# Patient Record
Sex: FEMALE | Race: BLACK OR AFRICAN AMERICAN | NOT HISPANIC OR LATINO | URBAN - METROPOLITAN AREA
[De-identification: names, ages, dates, MRNs, and addresses within clinical notes are randomized per-mention and may not be internally consistent; named-entity substitution may affect disease eponyms.]

---

## 2018-02-15 ENCOUNTER — OFFICE VISIT CONVERTED (OUTPATIENT)
Dept: PULMONOLOGY | Facility: CLINIC | Age: 68
End: 2018-02-15
Attending: PHYSICIAN ASSISTANT

## 2018-02-22 ENCOUNTER — OFFICE VISIT CONVERTED (OUTPATIENT)
Dept: PULMONOLOGY | Facility: CLINIC | Age: 68
End: 2018-02-22
Attending: PHYSICIAN ASSISTANT

## 2018-02-26 ENCOUNTER — OFFICE VISIT CONVERTED (OUTPATIENT)
Dept: CARDIOLOGY | Facility: CLINIC | Age: 68
End: 2018-02-26
Attending: INTERNAL MEDICINE

## 2021-05-16 VITALS
BODY MASS INDEX: 34.99 KG/M2 | DIASTOLIC BLOOD PRESSURE: 78 MMHG | WEIGHT: 210 LBS | HEIGHT: 65 IN | HEART RATE: 48 BPM | SYSTOLIC BLOOD PRESSURE: 102 MMHG

## 2021-05-28 VITALS
RESPIRATION RATE: 20 BRPM | DIASTOLIC BLOOD PRESSURE: 71 MMHG | OXYGEN SATURATION: 97 % | BODY MASS INDEX: 36.65 KG/M2 | HEIGHT: 65 IN | HEART RATE: 50 BPM | DIASTOLIC BLOOD PRESSURE: 99 MMHG | SYSTOLIC BLOOD PRESSURE: 141 MMHG | RESPIRATION RATE: 16 BRPM | TEMPERATURE: 97.7 F | HEIGHT: 65 IN | TEMPERATURE: 98.1 F | BODY MASS INDEX: 36.04 KG/M2 | WEIGHT: 220 LBS | SYSTOLIC BLOOD PRESSURE: 119 MMHG | OXYGEN SATURATION: 91 % | WEIGHT: 216.31 LBS | HEART RATE: 120 BPM

## 2021-05-28 NOTE — PROGRESS NOTES
Patient: JOCELYNE RUIZ     Acct: [id number]     Report: #[id number]  UNIT #: [id number]     : 1950    Encounter Date:2018  PRIMARY CARE: TAMMY PEGUERO  ***Signed***  --------------------------------------------------------------------------------------------------------------------  Chief Complaint      Encounter Date      2018            Referring Provider      Kale Mas            Patient Complaint      Patient is complaining of       One Month Follow Up/Labs            VITALS      Height 5 ft 5 in / 165.1 cm      Weight 216 lbs 5 oz / 98.362220 kg      BSA 2.16 m2      BMI 36.0 kg/m2      Temperature 97.7 F / 36.5 C - Oral      Pulse 50      Respirations 16      Blood Pressure 119/71 Sitting, Right Arm      Pulse Oximetry 97%, 2            HPI      The patient is a very pleasant 67 year old  female who I saw 1     week ago for hospital follow up after she was admitted with an acute diastolic     heart failure exacerbation flare up of allergic bronchopulmonary aspergillosis     as well as acute kidney injury. She is now at [facility] rehab where she went     after being diuresed and she is currently using Brovana and Pulmicort     nebulizers twice daily.             When I saw her 1 week ago she complained of very significant lower extremity     edema and abdominal distension as well as facial and neck edema. She reports     she was only taking 1 mg of Bumex once daily. She also complained of     significant cough and pleuritic chest pain but today reports that her coughing     is improving after I added DuoNeb and increased her Bumex to 1 mg twice daily     rather than once daily. She was also complaining of right hand weakness and I     had written for this to be evaluated at the rehab facility but she states it     never was and her right hand is still weak and she is having numbness in the     fingers that keep her from gripping anything. She is currently able to  do some     ambulation without any significant shortness of breath but needs a wheelchair     for longer distances due to weakness. She reports that her cough is improving     but she is still occasionally coughing up grayish phlegm. She is chronically on     2 liters nasal cannula for chronic hypoxic respiratory failure which is     unchanged. She continues to take voriconazole for her allergic bronchopulmonary     aspergillosis plus Prednisone 20 mg daily. She is on Coumadin for her chronic     atrial fibrillation and this is being monitored at [facility].             I have personally reviewed the Review of Systems, past family, social, surgical     and medical histories and I agree with the findings.            ROS      Constitutional:  Denies: Fatigue, Fever, Weight gain, Weight loss, Chills,     Insomnia, Other      Respiratory/Breathing:  Complains of: Shortness of air, Wheezing, Cough, Other (    body is swollen ), Denies: Hemoptysis, Pleuritic pain      Endocrine:  Denies: Polydipsia, Polyuria, Heat/cold intolerance, Abnorml     menstrual pattern, Diabetes, Other      Eyes:  Denies: Blurred vision, Vision Changes, Other      Ears, nose, mouth, throat:  Denies: Congestion, Dysphagia, Hearing Changes,     Nose Bleeding, Nasal Discharge, Throat pain, Tinnitus, Other      Cardiovascular:  Denies: Chest Pain, Exertional dyspnea, Peripheral Edema,     Palpitations, Syncope, Wake up Gasping for air, Orthopnea, Tachycardia, Other      Gastrointestinal:  Denies: Abdominal pain/cramping, Bloody stools, Constipation    , Diarrhea, Melena, Nausea, Vomiting, Other      Genitourinary:  Denies: Dysuria, Urinary frequency, Incontinence, Hematuria,     Urgency, Other      Musculoskeletal:  Denies: Joint Pain, Joint Stiffness, Joint Swelling, Myalgias    , Other      Hematologic/lymphatic:  DENIES: Lymphadenopathy, Bruising, Bleeding tendencies,     Other      Neurologic:  Denies: Headache, Numbness, Weakness, Seizures, Other  "     Psychiatric:  Denies: Anxiety, Appropriate Effect, Depression, Other      Sleep:  No: Excessive daytime sleep, Morning Headache?, Snoring, Insomnia?,     Stop breathing at sleep?, Other      Integumentary:  Denies: Rash, Dry skin, Skin Warm to Touch, Other            FAMILY/SOCIAL/MEDICAL HX      Surgical History:  Yes: Abdominal Surgery (\"CLOT IN STOMACH\", WOUND VAC IN     STOMACH), CABG, No: Appendectomy, Bladder Surgery, Bowel Surgery,     Cholecystectomy, Head Surgery, Oral Surgery, Orthopedic Surgery, Vascular     Surgery      Stroke - Family Hx:  Aunt      Heart - Family Hx:  Father      Diabetes - Family Hx:  Brother, Sister      Cancer/Type - Family Hx:  Father      Other Family Medical History:  Aunt (EMPHYSEMA), Cousin      Is Father Still Living?:  No      Is Mother Still Living?:  Yes      Social History:  No Tobacco Use, No Alcohol Use, No Recreational Drug use      Smoking status:  Current every day smoker (2-3 CIGS PER DAY / FORMER HEAVY     SMOKER )      Anticoagulation Therapy:  No      Antibiotic Prophylaxis:  No      Medical History:  Yes: Arthritis (IN BACK), Asthma, Blood Disease (BLOOD     TRANSFUSION REACTION, ANEMIA), Chronic Bronchitis/COPD, Congestive Heart Failu,     Diabetes (TYPE II), Seizures (AFTER TAKING METFORMIN), Heart Attack, Hemorrhoids    /Rectal Prob (THINKS SHE MAY HAVE 2 HERNIAS), High Blood Pressure, Shortness Of     Breath, Stroke, Miscellaneous Medical/oth (RHEUMATIC FEVER), No: Chemotherapy/    Cancer, Deafness or Ringing Ears, Sinus Trouble            Hx Influenza Vaccination:  Yes      Date Influenza Vaccine Given:  Nov 1, 2017      Influenza Vaccine Declined:  No      Hx Pneumococcal Vaccination:  Yes      Encouraged to follow-up with:  PCP regarding preventative exams.      Chart initiated by      carmen knowles            ALLERGIES/MEDICATIONS      Allergies:        Coded Allergies:             LEVOFLOXACIN (Verified  Allergy, Mild, NUMBNESS AND TINGLING IN " FINGERS     AND LEGS , 2/22/18)           ERYTHROMYCIN BASE (Verified  Allergy, Unknown, 2/22/18)           LISINOPRIL (Verified  Allergy, Unknown, 2/22/18)           METFORMIN (Verified  Allergy, Unknown, 2/22/18)           METOPROLOL (Verified  Allergy, Unknown, 2/22/18)           SULFA (SULFONAMIDE ANTIBIOTICS) (Verified  Allergy, Unknown, 2/22/18)      Medications    Last Reconciled on 2/22/18 15:44 by JOSE ROBERTO MCKEON PA-C      Bumetanide (Bumetanide) 2 Mg Tablet      2 MG PO BID, #60 TAB 1 Refill         Prov: Estefania Mckeon PA-C         2/22/18       Benzonatate (Tessalon Perles) 100 Mg Cap      100 MG PO Q4H Y for COUGH, #100 CAP 2 Refills         Prov: Estefania Mckeon PA-C         2/15/18       Albuterol/Ipratropium (Duoneb) 3 Ml Ampul.neb      3 ML INH Q4H Y for SHORTNESS OF BREATH, #120 NEB 6 Refills         Prov: Estefania Mckeon PA-C         2/15/18       Warfarin Sod (Coumadin*) 3 Mg Tablet      3 MG PO QDAY, #30 TAB 0 Refills         Prov: Kale Mas         2/10/18       Diltiazem CD (Diltiazem CD) 120 Mg Cap.er.24h      120 MG PO QDAY for 30 Days, #30 CAP.ER         Prov: Kale Mas         2/10/18       predniSONE* (Deltasone*) 10 Mg Tablet      20 MG PO QDAY for 30 Days, #60 TAB 1 Refill         Prov: Kale Mas         2/10/18       Cholecalciferol (Vitamin D3*) 1,000 Units Capsule      1000 UNITS PO QDAY, #30 CAP 0 Refills         Reported         1/31/18       Budesonide/Formoterol Fumarate (Symbicort 80/4.5 Mcg) 10.2 Gm Hfa.aer.ad      1 PUFF INH RTBID, #1 INH         Reported         1/31/18       Tiotropium Bromide (Spiriva Respimat 2.5 mcg/Puff) 4 Gm Mist.inhal      2 PUFFS INH QDAY, #1 MDI 4 Refills         Prov: Ilia Lowe         1/31/18       Voriconazole (Voriconazole) 200 Mg Tablet      200 MG PO BID, #60 TAB 5 Refills         Prov: IRAM LEPE         12/22/17       Carvedilol (Coreg) 6.25 Mg Tablet      3.125 MG PO BID, #60 TAB 3 Refills         Prov: IRAM LEPE         12/22/17        Macitentan (OPSUMIT) 10 Mg Tablet      10 MG PO QDAY for 30 Days, #90 TAB 9 Refills         Prov: Ilia Lowe         11/16/17       Michael-Fluticasone (Fluticasone 50 mcg) 16 Gm Spray.susp      2 PUFFS NARE EACH QDAY, #1 BOTTLE 6 Refills         Prov: Ilia Lowe         10/26/17       Hydrocodone/Acetaminophen 5/325 MG (Hydrocodone/Acetaminophen 5/325 MG) 1 Each     Tablet      1 TAB PO QID Y for PAIN, TAB         Reported         10/26/17       glipiZIDE (glipiZIDE*) 10 Mg Tablet      10 MG PO BID, #30 TAB 0 Refills         Reported         10/26/17       Loratadine (Loratadine) 10 Mg Tablet      10 MG PO QDAY, #30 TAB 0 Refills         Reported         10/26/17       Pioglitazone Hcl (Actos*) 15 Mg Tab      15 MG PO QDAY, #30 TAB         Reported         10/26/17       Neb-Budesonide (Budesonide) 0.5 Mg/2 Ml Ampul.neb      0.5 MG INH BID, #60 NEB 6 Refills         Prov: Ilia Lowe         4/19/17       Arformoterol Tartrate (Brovana) 15 Mcg/2 Ml Vial.neb      15 MCG INH BID, #60 NEB 6 Refills         Prov: Ilia Lowe         4/19/17       Miscellaneous Medical Supply (Pulse Oximeter) 1 Each Each      1 EACH XX ONCE for Chronic Hypoxemia, #1 EACH 0 Refills         Prov: SALOME WALLIS         4/4/17       Pantoprazole (Protonix*) 40 Mg Tablet.dr      40 MG PO QDAY@07, #30 TAB 0 Refills         Reported         3/27/17       Aspirin (Aspirin*) 81 Mg Tab.chew      81 MG PO QDAY, #30 TAB.CHEW 0 Refills         Reported         6/19/16      Current Medications      Current Medications Reviewed 2/22/18            EXAM      GEN-patient appears stated age resting comfortable in no acute distress. Her     neck and face is edematous.        Eyes-PERRL,  conjunctiva are normal in appearance extraocular muscles are intact    , no scleral icterus      Nasal-both nares are patent turbinates appear normal no polyps seen no nasal     discharge or ulcerations      Ears-tympanic membranes are normal no erythema no  bulging, normal to inspection      Lymphatic-no swollen or enlarged cervical nodes, or axillary node, or femoral     nodes, or supraclavicular nodes      Mouth normal dentition, no erythema no ulcerations oropharynx appears normal no     exudate no evidence of postnasal drip, MP(default value)      Neck-there are no palpable supraclavicular or cervical adenopathy, thyroid is     normal in appearance no apparent nodules, there is no inspiratory or expiratory     stridor      Respiratory- mildly decreased with mildly coarse breath sounds in bilateral     lung bases.       Cardiovascular-the heart rate irregularly irregular rhythm, tachycardic rate,     no murmurs, rubs or gallops appreciated.       GI-the abdomen is obese, slightly firm, mildly distended, nontender to     palpation.       Extremities-bilateral lower extremities are warm and dry with +2-3 pitting     edema up to knees bilaterally.       Musculoskeletal-Normal strength in upper and lower extremities, inspection     shows no evidence of muscle atrophy. Bilateral lower extremities have +3     pitting edema slightly greater on the left than the right.       Skin-skin is normal in appearance it is warm and dry, no rashes present, no     evidence of cyanosis, palpation reveals no masses      Neurological-the patient is alert and oriented to time place and person, moves     all 4 extremities, normal gait, normal affect and mood, CN2-12 intact      Psych-normal judgment and insight is good, normal mood and affect, alert and     oriented to person, place, and time, and date      Vitals      Vitals:             Height 5 ft 5 in / 165.1 cm           Weight 216 lbs 5 oz / 98.467609 kg           BSA 2.16 m2           BMI 36.0 kg/m2           Temperature 97.7 F / 36.5 C - Oral           Pulse 50           Respirations 16           Blood Pressure 119/71 Sitting, Right Arm           Pulse Oximetry 97%, 2            REVIEW      Results Reviewed      [facility] Results  Reviewed?:  Yes Prev Lab Results, Yes Prev Radiology Results, Yes     Previous [facility] Records            PLAN      Assessment      Notes      New Medications      * Bumetanide 2 MG TABLET: 2 MG PO BID #60       Replaced Bumetanide (Bumex) 1 MG TABLET:       1 MG PO BID #60      Discontinued Medications      * Bumetanide (Bumex) 1 MG TABLET: 1 MG PO BID #60      ASSESSMENT:      1. Acute on chronic diastolic congestive heart failure significant volume     overload.        2. Allergic bronchopulmonary aspergillosis with recent exacerbation now     resolving.       3. Chronic obstructive pulmonary disease without acute exacerbation.       4. Chronic hypoxic respiratory failure on 2 liters nasal cannula.       5. Obesity with BMI 36.0      6. Chronic atrial fibrillation on Coumadin.        7.  Significant lower extremity edema.       8. Cough resolving.       9. Right hand numbness and weakness.              PLAN:      1. Last week I increased her Bumex to 1 mg PO twice daily from 1 mg PO daily     and had a basic metabolic panel checked on 02/20/18 which showed her renal     function to be stable with creatinine 0.9, BUN 36, potassium 4.5 and sodium     132. Given that she is still significantly volume overloaded, I will increase     her Bumex to 2 mg PO twice daily and I will actually have them give her 2 mg IV     twice daily for 2 days and then change to PO. I will have another basic     metabolic panel checked in 1 week to ensure stabilization of her electrolytes     and renal function.       2. I have written an order for [facility] to have her weight checked daily at     the rehab and to call her cardiologist Dr. Mireles for any weight gain greater     than 2 pounds overnight or 5 lbs in a week. His office should be the one     managing her diuretics. I have also written for her to be on a diet of 2 gram     daily sodium or less and to have her legs elevated while she is sitting.       3. Her shortness of breath and  coughing seem to be improving so I will continue     her Brovana and Pulmicort nebulizers and she can also use DuoNeb as needed.       4. Continue on Opsumit for her pulmonary hypertension and continue voriconazole     and Prednisone.       5. I have again asked that her right hand numbness and weakness be evaluated     and I have written for a neurology consult to do this since it does not appear     that her hand was evaluated at rehab despite my past orders.       6. We did call the rehab facility today to discuss these orders because all of     them were not completed after her last office visit. They did not send the     results of he basic metabolic panel with her today but we will able to receive     them by fax.       7. She should keep her existing follow up appointment for next month.                 Disclaimer: Converted document may not contain table formatting or lab diagrams. Please see [facility] for the authenticated document.

## 2021-05-28 NOTE — PROGRESS NOTES
Patient: JOCELYNE RUIZ     Acct: [id number]     Report: #[id number]  UNIT #: [id number]     : 1950    Encounter Date:02/15/2018  PRIMARY CARE: TAMMY PEGUERO  ***Signed***  --------------------------------------------------------------------------------------------------------------------  Chief Complaint      Encounter Date      Feb 15, 2018            Referring Provider      Tan Mas            Patient Complaint      Patient is complaining of      pt  here for 7 day HERNANDEZ            TRANSITION OF CARE 7 D      Transition of Care      HERNANDEZ 7 Day Followup      Jocelyne presents to office for follow up post discharge from inpatient status     within 7 calendar days. Patient was contacted within 2 business days via phone     conversation. Documentation of that phone call is present in the patient's     electronic chart. She  was admitted to inpatient facility on 18 and was     discharged on 2-10-18 due to: Acute decompensation of diastolic CHF      ABPA      Interstitial lung disease      pulmonary hypertension on opsumit      DJD of C-Spine      Electrolyte derangement due to diuresis and hyperglycemia      Acute COPD exacerbation       Acute on chronic hypoxic and hypercapnic respiratory failure      3 mm RLL pulmonary nodule; stable from at least 2017      Herniation of a small portion of the right lung through anterior right chest     wall      MER with CKD stage 3      Hyponatremia      CAD      Hypertension      Chronic Atrial fibrillation      Supratherapeutic INR on admission without acute bleeding; now therapeutic      Long term monitoring of anticoagulation      Type II diabetes mellitus; hyperglycemia/uncontrolled on admission      Tobacco abuse with cigarettes      History of CVA      Osteoarthritis-Multiple Joints      History of rheumatic valvular heart disease s/p Bioprosthetic mitral valve     replacement      Pulmonary Hypertension .            Admitting MD: TAN MAS             Hers  discharge summary has been reviewed and placed in the patient's     electronic chart.            Problem List      Problem List Reviewed      Patient's problem list has been reviewed from patient discharge summary.            Medications Reviewed      Meds Reviewed      Patient s outpatient medication list has been reconciled with the medication     list from the discharge summary and has been reviewed with the patient.            VITALS      Height 5 ft 5 in / 165.1 cm      Weight 220 lbs 0 oz / 99.007498 kg      BSA 2.18 m2      BMI 36.6 kg/m2      Temperature 98.1 F / 36.72 C - Oral      Pulse 120      Respirations 20      Blood Pressure 141/99 Sitting, Left Arm      Pulse Oximetry 91%, NASAL CANNULA, 2 lpm            HPI      The patient is a 67 year old female accompanied today by a nursing assistant     from [facility] where she has been for the past several days after being     discharged from [facility]. She was admitted to [facility] with a multitude of issues including acute decompensated diastolic     congestive heart failure, flare up of allergic bronchopulmonary aspergillosis,     significant back pain noted to have degenerative disc disease of her C-spine,     acute kidney injury and super therapeutic INR due to the Coumadin that she     takes for her chronic atrial fibrillation. We saw her in the hospital and     optimized her from a pulmonary standpoint with breathing treatments and she has     continued to use Brovana and Pulmicort nebulizers twice daily while at     [facility]. She was also diuresed but reports that she has continued to have     significant lower extremity edema and abdominal distension as well as facial     and neck edema since her discharge. She reports that all this edema has been     ongoing for several months. Her shortness of breath is gradually improving but     she still complains of significant cough which causes pleuritic chest  pain when     she does cough. She is coughing up clear to yellow to gray sputum which has     been chronic for her as of late. She has also had significant wheezing. Her     symptoms are improved with her breathing treatments but still ongoing. She has     been very weak and debilitated which is why she needed to go to the rehab     facility and she reports that some of this has improving but she has a lot of     issues with chronic pain including back pain. She is also complaining of some     right hand and finger numbness that she reports has been going on since she got     to the rehab facility and she thinks may be related to a muscle relaxer she was     given. She has not had this evaluated at the rehab yet but she is able to move     her hand normally form me today. She is chronically on 2 liters nasal cannula     for chronic hypoxic respiratory failure. She continues to report shortness of     breath with any exertion such as ambulation from room to room, relieved with     rest. She continues to take voriconazole for her allergic bronchopulmonary     aspergillosis as well as Prednisone 20 mg daily. She is on Coumadin for chronic     atrial fibrillation and reports that she has had more palpitations recently and     has noticed her heart rate to be elevated. She does take Cardizem  mg     daily but her heart rate was noted to be 122 today in the office.             I have personally reviewed the Review of Systems, past family, social, surgical     and medical histories and I agree with the findings.            ROS      Constitutional:  Denies: Fatigue, Fever, Weight gain, Weight loss, Chills,     Insomnia, Other      Respiratory/Breathing:  Complains of: Shortness of air, Wheezing, Cough, Denies    : Hemoptysis, Pleuritic pain, Other      Endocrine:  Denies: Polydipsia, Polyuria, Heat/cold intolerance, Abnorml     menstrual pattern, Diabetes, Other      Eyes:  Denies: Blurred vision, Vision Changes,  "Other      Ears, nose, mouth, throat:  Denies: Congestion, Dysphagia, Hearing Changes,     Nose Bleeding, Nasal Discharge, Throat pain, Tinnitus, Other      Cardiovascular:  Denies: Chest Pain, Exertional dyspnea, Peripheral Edema,     Palpitations, Syncope, Wake up Gasping for air, Orthopnea, Tachycardia, Other      Gastrointestinal:  Denies: Abdominal pain/cramping, Bloody stools, Constipation    , Diarrhea, Melena, Nausea, Vomiting, Other      Genitourinary:  Denies: Dysuria, Urinary frequency, Incontinence, Hematuria,     Urgency, Other      Musculoskeletal:  Denies: Joint Pain, Joint Stiffness, Joint Swelling, Myalgias    , Other      Hematologic/lymphatic:  DENIES: Lymphadenopathy, Bruising, Bleeding tendencies,     Other      Neurologic:  Denies: Headache, Numbness, Weakness, Seizures, Other      Psychiatric:  Denies: Anxiety, Appropriate Effect, Depression, Other      Sleep:  No: Excessive daytime sleep, Morning Headache?, Snoring, Insomnia?,     Stop breathing at sleep?, Other      Integumentary:  Denies: Rash, Dry skin, Skin Warm to Touch, Other            FAMILY/SOCIAL/MEDICAL HX      Surgical History:  Yes: Abdominal Surgery (\"CLOT IN STOMACH\", WOUND VAC IN     STOMACH), CABG, No: Appendectomy, Bladder Surgery, Bowel Surgery,     Cholecystectomy, Head Surgery, Oral Surgery, Orthopedic Surgery, Vascular     Surgery      Stroke - Family Hx:  Aunt      Heart - Family Hx:  Father      Diabetes - Family Hx:  Brother, Sister      Cancer/Type - Family Hx:  Father      Other Family Medical History:  Aunt (EMPHYSEMA), Cousin      Is Father Still Living?:  No      Is Mother Still Living?:  Yes      Social History:  No Tobacco Use, No Alcohol Use, No Recreational Drug use      Smoking status:  Current every day smoker (2-3 CIGS PER DAY / FORMER HEAVY     SMOKER )      Anticoagulation Therapy:  No      Antibiotic Prophylaxis:  No      Medical History:  Yes: Arthritis (IN BACK), Asthma, Blood Disease (BLOOD   "   TRANSFUSION REACTION, ANEMIA), Chronic Bronchitis/COPD, Congestive Heart Failu,     Diabetes (TYPE II), Seizures (AFTER TAKING METFORMIN), Heart Attack, Hemorrhoids    /Rectal Prob (THINKS SHE MAY HAVE 2 HERNIAS), High Blood Pressure, Shortness Of     Breath, Stroke, Miscellaneous Medical/oth (RHEUMATIC FEVER), No: Chemotherapy/    Cancer, Deafness or Ringing Ears, Sinus Trouble            Hx Influenza Vaccination:  Yes      Date Influenza Vaccine Given:  Nov 1, 2017      Influenza Vaccine Declined:  No      2 or More Falls Past Year?:  No      Fall Past Year with Injury?:  No      Hx Pneumococcal Vaccination:  Yes      Encouraged to follow-up with:  PCP regarding preventative exams.      Chart initiated by      GABE GATES MA            ALLERGIES/MEDICATIONS      Allergies:        Coded Allergies:             LEVOFLOXACIN (Verified  Allergy, Mild, NUMBNESS AND TINGLING IN FINGERS     AND LEGS , 2/15/18)           ERYTHROMYCIN BASE (Verified  Allergy, Unknown, 2/15/18)           LISINOPRIL (Verified  Allergy, Unknown, 2/15/18)           METFORMIN (Verified  Allergy, Unknown, 2/15/18)           METOPROLOL (Verified  Allergy, Unknown, 2/15/18)           SULFA (SULFONAMIDE ANTIBIOTICS) (Verified  Allergy, Unknown, 2/15/18)      Medications    Last Reconciled on 2/15/18 10:34 by JOSE ROBERTO SIN PA-C      Benzonatate (Tessalon Perles) 100 Mg Cap      100 MG PO Q4H Y for COUGH, #100 CAP 2 Refills         Prov: Estefania Sin PA-C         2/15/18       Albuterol/Ipratropium (Duoneb) 3 Ml Ampul.neb      3 ML INH Q4H Y for SHORTNESS OF BREATH, #120 NEB 6 Refills         Prov: Estefania Sin PA-C         2/15/18       Bumetanide (Bumex) 1 Mg Tablet      1 MG PO BID, #60 TAB         Prov: Estefania Sin PA-C         2/15/18       Warfarin Sod (Coumadin*) 3 Mg Tablet      3 MG PO QDAY, #30 TAB 0 Refills         Prov: Kale Mas         2/10/18       Diltiazem CD (Diltiazem CD) 120 Mg Cap.er.24h      120 MG PO QDAY for 30  Days, #30 CAP.ER         Prov: Kale Mas         2/10/18       predniSONE* (Deltasone*) 10 Mg Tablet      20 MG PO QDAY for 30 Days, #60 TAB 1 Refill         Prov: Kale Mas         2/10/18       Cholecalciferol (Vitamin D3*) 1,000 Units Capsule      1000 UNITS PO QDAY, #30 CAP 0 Refills         Reported         1/31/18       Budesonide/Formoterol Fumarate (Symbicort 80/4.5 Mcg) 10.2 Gm Hfa.aer.ad      1 PUFF INH RTBID, #1 INH         Reported         1/31/18       Tiotropium Bromide (Spiriva Respimat 2.5 mcg/Puff) 4 Gm Mist.inhal      2 PUFFS INH QDAY, #1 MDI 4 Refills         Prov: Ilia Lowe         1/31/18       Voriconazole (Voriconazole) 200 Mg Tablet      200 MG PO BID, #60 TAB 5 Refills         Prov: IRAM LEPE         12/22/17       Carvedilol (Coreg) 6.25 Mg Tablet      3.125 MG PO BID, #60 TAB 3 Refills         Prov: IRAM LEPE         12/22/17       Macitentan (OPSUMIT) 10 Mg Tablet      10 MG PO QDAY for 30 Days, #90 TAB 9 Refills         Prov: Ilia Lowe         11/16/17       Michael-Fluticasone (Fluticasone 50 mcg) 16 Gm Spray.susp      2 PUFFS NARE EACH QDAY, #1 BOTTLE 6 Refills         Prov: Ilia Lowe         10/26/17       Hydrocodone/Acetaminophen 5/325 MG (Hydrocodone/Acetaminophen 5/325 MG) 1 Each     Tablet      1 TAB PO QID Y for PAIN, TAB         Reported         10/26/17       glipiZIDE (glipiZIDE*) 10 Mg Tablet      10 MG PO BID, #30 TAB 0 Refills         Reported         10/26/17       Loratadine (Loratadine) 10 Mg Tablet      10 MG PO QDAY, #30 TAB 0 Refills         Reported         10/26/17       Pioglitazone Hcl (Actos*) 15 Mg Tab      15 MG PO QDAY, #30 TAB         Reported         10/26/17       Neb-Budesonide (Budesonide) 0.5 Mg/2 Ml Ampul.neb      0.5 MG INH BID, #60 NEB 6 Refills         Prov: Ilia Lowe         4/19/17       Arformoterol Tartrate (Brovana) 15 Mcg/2 Ml Vial.neb      15 MCG INH BID, #60 NEB 6 Refills         Prov: Ilia Lowe          4/19/17       Miscellaneous Medical Supply (Pulse Oximeter) 1 Each Each      1 EACH XX ONCE for Chronic Hypoxemia, #1 EACH 0 Refills         Prov: SALOME WALLIS         4/4/17       Pantoprazole (Protonix*) 40 Mg Tablet.dr      40 MG PO QDAY@07, #30 TAB 0 Refills         Reported         3/27/17       Aspirin (Aspirin*) 81 Mg Tab.chew      81 MG PO QDAY, #30 TAB.CHEW 0 Refills         Reported         6/19/16      Current Medications      Current Medications Reviewed 2/15/18            EXAM      GEN-patient appears stated age resting comfortable in no acute distress      Eyes-PERRL,  conjunctiva are normal in appearance extraocular muscles are intact    , no scleral icterus      Nasal-both nares are patent turbinates appear normal no polyps seen no nasal     discharge or ulcerations      Ears-tympanic membranes are normal no erythema no bulging, normal to inspection      Lymphatic-no swollen or enlarged cervical nodes, or axillary node, or femoral     nodes, or supraclavicular nodes      Mouth normal dentition, no erythema no ulcerations oropharynx appears normal no     exudate no evidence of postnasal drip, MP(default value)      Neck-there are no palpable supraclavicular or cervical adenopathy, thyroid is     normal in appearance no apparent nodules, there is no inspiratory or expiratory     stridor      Respiratory- mildly diminished breath sounds throughout with scattered     expiratory wheezing and rhonchi appreciated throughout.       Cardiovascular-the heart rate irregularly irregular rhythm, tachycardic rate,     no murmurs, rubs or gallops appreciated.       GI-the abdomen is obese, slightly firm, mildly distended, nontender to     palpation.       Extremities-bilateral lower extremities are warm and dry with +2-3 pitting     edema up to knees bilaterally.       Musculoskeletal-Normal strength in upper and lower extremities, inspection     shows no evidence of muscle atrophy      Skin-skin is normal in  appearance it is warm and dry, no rashes present, no     evidence of cyanosis, palpation reveals no masses      Neurological-the patient is alert and oriented to time place and person, moves     all 4 extremities, normal gait, normal affect and mood, CN2-12 intact      Psych-normal judgment and insight is good, normal mood and affect, alert and     oriented to person, place, and time, and date      Vitals      Vitals:             Height 5 ft 5 in / 165.1 cm           Weight 220 lbs 0 oz / 99.448028 kg           BSA 2.18 m2           BMI 36.6 kg/m2           Temperature 98.1 F / 36.72 C - Oral           Pulse 120           Respirations 20           Blood Pressure 141/99 Sitting, Left Arm           Pulse Oximetry 91%, NASAL CANNULA, 2 lpm            REVIEW      Results Reviewed      PCCS Results Reviewed?:  Yes Prev Lab Results, Yes Prev Radiology Results, Yes     Previous [facility] Records (I personally reviewed the patient's pulmonary     consultations, progress notes and discharge summary from her recent     hospitalization. )      Lab Results      I personally reviewed the patient's laboratory work.      Radiographic Results               [facility]                PACS RADIOLOGY REPORT            Patient: JOCELYNE RUIZ   Acct: #[id number]   Report: #[id number]            UNIT #: [id number]    DOS: 18 1704      INSURANCE:MEDICARE PART A   LOCATION:[place] [address]   : 1950            PROVIDERS      ADMITTING:  Scar Marino   ATTENDING: Scar Marino      FAMILY:  MARIELENATAMMY   ORDERING:  Scar Marino         OTHER:    DICTATING:  Carlyle Enriquez MD            REQ #:[id number]   EXAM:WO - CT CHEST without CONTRAST      REASON FOR EXAM:  DYSPNEA, CHF, COPD      REASON FOR VISIT:  CP,MER,CHF EXAC.,COPD EXAC.            *******Signed******         PROCEDURE:   CT CHEST WITHOUT CONTRAST             COMPARISON:   [facility], CT,  CHEST W/O CONTRAST, 3/27/2017, 19:    02.  [facility], CT, ABD PEL W/O CONTRAST, 5/20/2012, 1:02.  [facility],     CR, CHEST AP/PA 1       VIEW, 1/31/2018, 15:33.  [facility], CT, CHEST W/ CONTRAST, 11/08/ 2017, 15:58.             INDICATIONS:   DYSPNEA, CONGESTIVE HEART FAILURE, COPD             TECHNIQUE:   CT images were created without the administration of contrast     material.               PROTOCOL:     Standard imaging protocol performed                RADIATION:     DLP: 431.8mGy*cm          Automated exposure control was utilized to minimize radiation dose.              FINDINGS:         There is mild debris within the mid trachea, and the residual central     tracheobronchial tree is       clear. Mild pulmonary fibrotic changes appear similar to prior CT. Herniation     of a small portion of       the right lung through the anterior right chest wall is unchanged. No focal     airspace consolidation       is identified. A 3 mm right lower lobe nodule image 43 is unchanged. No new     pulmonary nodule is       identified. There is no pleural effusion.             The heart is mildly enlarged with some partially calcified atherosclerotic     disease within the       coronary arteries. The thoracic aorta appears normal caliber. The main     pulmonary artery is mildly       enlarged, suggestive of mild pulmonary arterial hypertension. No abnormally     enlarged lymph nodes       are identified.             Partial evaluation of the upper abdomen is unremarkable.             No aggressive osseous lesions are identified.             CONCLUSION:         1. No acute cardiopulmonary process.      2. Mild pulmonary fibrotic changes appear similar to prior CT.      3. 3 mm right lower lobe pulmonary nodule is stable from at least 03/27/2017,     and is likely benign.              SALOME CROWE MD             Electronically Signed and Approved By: SALOME CROWE  MD on 1/31/2018 at 18:    17                        Until signed, this is an unconfirmed preliminary report that may contain      errors and is subject to change.                                              RODD1:      D:01/31/18 1817            PLAN      Assessment      Notes      New Medications      * Albuterol/Ipratropium (Duoneb) 3 ML AMPUL.NEB: 3 ML INH Q4H PRN SHORTNESS OF     BREATH #120       Instructions: DIAGNOSIS CODE REQUIRED PRIOR TO PRESCRIBING.      * Benzonatate (Tessalon Perles) 100 MG CAP: 100 MG PO Q4H PRN COUGH #100      Changed Medications      * Bumetanide (Bumex) 1 MG TABLET:       From: 1 MG PO QDAY #60       To: 1 MG PO BID #60      New Diagnostics      * BMP, Week       Dx: Chronic kidney disease - N18.9      ASSESSMENT:      1. Acute on chronic diastolic congestive heart failure.       2. Allergic bronchopulmonary aspergillosis with recent exacerbation improving.       3. Chronic obstructive pulmonary disease with emphysema and acute exacerbation     improving.       4. Chronic hypoxic respiratory failure on 2 liters nasal cannula.       5. Obesity with BMI 36.6      6. Chronic atrial fibrillation on Coumadin with heart rate currently     uncontrolled.       7. Lower extremity edema.       8. Cough.       9.  Wheeze.       10. Dyspnea on exertion.       11. Right hand numbness.             PLAN:      1. At this time the patient appears to be significantly volume overloaded from     acute on chronic diastolic congestive heart failure exacerbation. She has     currently only been taking Bumex 1 mg PO daily so at this time I will increase     that to 1 mg PO twice daily and have a basic metabolic panel checked in 1 week.      2. I have instructed her to have her weight checked daily at her rehab facility     and to call her cardiologist Dr. Mireles for any weight gain greater than 2     pounds overnight or 5 pounds in a week. I have also counseled her on staying on     2 gram sodium daily  diet or less.       3. She has some continued wheezing and coughing that appears related to her     recent allergic bronchopulmonary aspergillosis exacerbation although her     shortness of breath is gradually improving. At this time I have added DuoNeb     and instructed her to use those every 4 hours as needed for cough, wheezing or     shortness of breath. I have also ordered Tessalon pearls to help with her cough     particularly at night when her cough is keeping her from sleeping.       4. I have instructed her to continue on Brovana and Pulmicort scheduled     nebulizers twice daily and to continue her voriconazole and Prednisone.       5. I have instructed her to continue her Opsumit for her pulmonary     hypertension.       6. I have requested that her vital signs be checked twice daily at her rehab     facility and have instructed them to call her cardiologist Dr. Mireles for any     persistent heart rate greater than 110 beats per minute. This is because it     appears her atrial fibrillation is also uncontrolled at this time and if her     heart rates are persistently elevated this may also be contributing to her     shortness of breath.       7. I advised that her right hand numbness should be evaluated by her rehab     physician or primary care provider as I am not the correct provider to evaluate     that for her today.       8. I have asked her to follow up in our office in 1 month so we can review her     laboratory results and see how she is doing with her medication changes. I have     instructed her to call as needed for any issues prior to that.            Patient Education      Patient Education Provided:  Acute Respiratory Syndrom, COPD, How to use a     Nebulizer                 Disclaimer: Converted document may not contain table formatting or lab diagrams. Please see [employer] for the authenticated document.